# Patient Record
Sex: MALE | Race: WHITE | NOT HISPANIC OR LATINO | Employment: OTHER | URBAN - NONMETROPOLITAN AREA
[De-identification: names, ages, dates, MRNs, and addresses within clinical notes are randomized per-mention and may not be internally consistent; named-entity substitution may affect disease eponyms.]

---

## 2019-08-19 ENCOUNTER — CONSULT (OUTPATIENT)
Dept: SURGERY | Facility: CLINIC | Age: 74
End: 2019-08-19

## 2019-08-19 VITALS
DIASTOLIC BLOOD PRESSURE: 80 MMHG | HEART RATE: 81 BPM | HEIGHT: 68 IN | BODY MASS INDEX: 28.28 KG/M2 | SYSTOLIC BLOOD PRESSURE: 126 MMHG | WEIGHT: 186.6 LBS | TEMPERATURE: 98.2 F

## 2019-08-19 DIAGNOSIS — C44.90 SKIN CANCER: Primary | ICD-10-CM

## 2019-08-19 PROCEDURE — 99203 OFFICE O/P NEW LOW 30 MIN: CPT | Performed by: SURGERY

## 2019-08-19 RX ORDER — FUROSEMIDE 40 MG/1
40 TABLET ORAL DAILY
COMMUNITY

## 2019-08-19 RX ORDER — ALOGLIPTIN AND METFORMIN HYDROCHLORIDE 12.5; 1 MG/1; MG/1
1 TABLET, FILM COATED ORAL DAILY
COMMUNITY

## 2019-08-19 RX ORDER — GEMFIBROZIL 600 MG/1
600 TABLET, FILM COATED ORAL
COMMUNITY

## 2019-08-19 RX ORDER — WARFARIN SODIUM 5 MG/1
5 TABLET ORAL
COMMUNITY

## 2019-08-19 RX ORDER — CARVEDILOL 12.5 MG/1
12.5 TABLET ORAL 2 TIMES DAILY WITH MEALS
COMMUNITY

## 2019-08-19 NOTE — PROGRESS NOTES
Subjective   Efrain Larkin is a 74 y.o. male     Chief Complaint: Skin cancers    History of Present Illness referred by dermatology after patient underwent shave biopsy of actually multiple lesions but only 2 on his back which were basal cell cancers is he referred for reexcision of shave biopsy sites.  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  Patient is on Coumadin for cardiac reasons.  Last INR was 2.2    Review of Systems   Constitutional: Negative.    HENT: Negative.    Eyes: Negative.    Respiratory: Positive for shortness of breath.    Cardiovascular: Negative.    Gastrointestinal: Negative.    Endocrine: Negative.    Genitourinary: Negative.    Musculoskeletal: Positive for back pain.        Leg pain   Skin: Negative.    Allergic/Immunologic: Negative.    Neurological: Negative.    Hematological: Negative.    Psychiatric/Behavioral: Negative.      Past Medical History:   Diagnosis Date   • Black lung disease (CMS/HCC)    • Circulation problem    • Diabetes (CMS/HCC)    • H/O blood clots    • Skin cancer      Past Surgical History:   Procedure Laterality Date   • CARDIAC SURGERY      Tripple bypass.   • TOE AMPUTATION      Middle toe-Rt foot.   • TOE AMPUTATION      partial toe amputation- second toe left foot   • VEIN SURGERY      Right leg vein surgery     Family History   Problem Relation Age of Onset   • Cancer Sister    • Diabetes Brother      Social History     Socioeconomic History   • Marital status:      Spouse name: Not on file   • Number of children: Not on file   • Years of education: Not on file   • Highest education level: Not on file   Tobacco Use   • Smoking status: Former Smoker     Types: Cigarettes     Last attempt to quit: 2005     Years since quittin.6   • Smokeless tobacco: Never Used   Substance and Sexual Activity   • Alcohol use: No     Frequency: Never      Allergies   Allergen Reactions   • Heparin Other (See Comments)     Caused Blood to Clot.   • Cozaar [Losartan Potassium] Rash   • Lisinopril Rash       Home Medications:  Prior to Admission medications    Medication Sig Start Date End Date Taking? Authorizing Provider   Alogliptin-metFORMIN HCl 12.5-1000 MG tablet Take 1 tablet by mouth Daily.   Yes Caitlyn Cunningham MD   carvedilol (COREG) 12.5 MG tablet Take 12.5 mg by mouth 2 (Two) Times a Day With Meals.   Yes Caitlyn Cunningham MD   furosemide (LASIX) 40 MG tablet Take 40 mg by mouth Daily.   Yes Caitlyn Cunningham MD   gemfibrozil (LOPID) 600 MG tablet Take 600 mg by mouth 2 (Two) Times a Day Before Meals.   Yes Caitlyn Cunningham MD   POTASSIUM CHLORIDE PO Take  by mouth.   Yes Caitlyn Cunningham MD   warfarin (COUMADIN) 5 MG tablet Take 5 mg by mouth Daily.   Yes Caitlyn Cunningham MD       Objective   Physical Exam   Constitutional: He is oriented to person, place, and time. He appears well-developed and well-nourished. No distress.   HENT:   Head: Normocephalic and atraumatic.   Nose: Nose normal.   Eyes: Conjunctivae are normal.   Neck: Normal range of motion. No tracheal deviation present. No thyromegaly present.   Cardiovascular: Normal rate, regular rhythm and normal heart sounds.   No murmur heard.  Pulmonary/Chest: Effort normal and breath sounds normal. No respiratory distress. He has no wheezes. He has no rales. He exhibits no tenderness.   Abdominal: Soft. He exhibits no distension. There is no tenderness. There is no rebound and no guarding. No hernia.   Musculoskeletal: He exhibits no tenderness or deformity.   Neurological: He is alert and oriented to person, place, and time.   Skin: Skin is warm and dry. No rash noted.   Psychiatric: He has a normal mood and affect. His behavior is normal. Judgment and thought content normal.   Vitals reviewed.  Sun damage both ears scalp face.  Mid back he has a 5 mm open wound and a  10 x 16 mm open wound consistent with shave biopsies.  No surrounding erythema or lesion associated.  No fluctuance noted significant drainage or redness.  No palpable adenopathy.    Assessment/Plan Basal cell cancer shave biopsy sites x2 on back.  Needs reexcision of both sites.  Fully discussed this with the patient about doing this in the office versus the operating room he wished to do it in the office under local.  We will set him up to have it done his convenience.  He is on Coumadin will have him continue the Coumadin at this point.      There were no encounter diagnoses.                     This document has been electronically signed by Katerin Tellez MA on August 19, 2019 3:57 PM

## 2019-08-19 NOTE — PATIENT INSTRUCTIONS

## 2019-08-21 ENCOUNTER — PROCEDURE VISIT (OUTPATIENT)
Dept: SURGERY | Facility: CLINIC | Age: 74
End: 2019-08-21

## 2019-08-21 VITALS
DIASTOLIC BLOOD PRESSURE: 66 MMHG | HEART RATE: 76 BPM | SYSTOLIC BLOOD PRESSURE: 112 MMHG | BODY MASS INDEX: 28.34 KG/M2 | TEMPERATURE: 98.5 F | HEIGHT: 68 IN | WEIGHT: 187 LBS

## 2019-08-21 DIAGNOSIS — C44.519 BASAL CELL CARCINOMA (BCC) OF UPPER BACK: Primary | ICD-10-CM

## 2019-08-21 PROCEDURE — 12032 INTMD RPR S/A/T/EXT 2.6-7.5: CPT | Performed by: SURGERY

## 2019-08-21 PROCEDURE — 88305 TISSUE EXAM BY PATHOLOGIST: CPT | Performed by: SURGERY

## 2019-08-21 PROCEDURE — 11606 EXC TR-EXT MAL+MARG >4 CM: CPT | Performed by: SURGERY

## 2019-08-21 PROCEDURE — 88305 TISSUE EXAM BY PATHOLOGIST: CPT | Performed by: PATHOLOGY

## 2019-08-23 LAB
LAB AP CASE REPORT: NORMAL
LAB AP CLINICAL INFORMATION: NORMAL
PATH REPORT.FINAL DX SPEC: NORMAL
PATH REPORT.GROSS SPEC: NORMAL

## 2019-08-28 ENCOUNTER — OFFICE VISIT (OUTPATIENT)
Dept: SURGERY | Facility: CLINIC | Age: 74
End: 2019-08-28

## 2019-08-28 VITALS
HEIGHT: 68 IN | DIASTOLIC BLOOD PRESSURE: 60 MMHG | WEIGHT: 186 LBS | BODY MASS INDEX: 28.19 KG/M2 | HEART RATE: 70 BPM | SYSTOLIC BLOOD PRESSURE: 116 MMHG | TEMPERATURE: 97.5 F

## 2019-08-28 DIAGNOSIS — C44.90 SKIN CANCER: Primary | ICD-10-CM

## 2019-08-28 PROCEDURE — 99024 POSTOP FOLLOW-UP VISIT: CPT | Performed by: SURGERY

## 2019-08-28 NOTE — PATIENT INSTRUCTIONS

## 2019-08-28 NOTE — PROGRESS NOTES
67-year-old gentleman here 1 week after excision of 2 shave biopsy sites of basal cell cancers on his back.   The 2 sites were close enough of that we were able to excise this is one excision.  On pathology there were no residual tumor at either site so thus the margins were negative.  Patient is doing well.  His incision is intact appears to be healing appropriately we discussed further local wound care.  Patient will follow-up with dermatology for further skin checks and to follow-up with us on apparent basis  Final Diagnosis   SKIN, LEFT UPPER BACK:   SHAVE EXCISIONAL SITES (2) NEGATIVE FOR RESIDUAL CARCINOMA.   SURGICAL MARGINS NEGATIVE FOR CARCINOMA.    Electronically signed by Darinel Bliss MD on 8/23/2019 at 1303